# Patient Record
Sex: MALE | Race: WHITE | URBAN - METROPOLITAN AREA
[De-identification: names, ages, dates, MRNs, and addresses within clinical notes are randomized per-mention and may not be internally consistent; named-entity substitution may affect disease eponyms.]

---

## 2017-02-21 ENCOUNTER — OFFICE VISIT (OUTPATIENT)
Dept: UROLOGY | Facility: CLINIC | Age: 42
End: 2017-02-21
Payer: COMMERCIAL

## 2017-02-21 VITALS
SYSTOLIC BLOOD PRESSURE: 108 MMHG | HEIGHT: 71 IN | DIASTOLIC BLOOD PRESSURE: 70 MMHG | HEART RATE: 70 BPM | WEIGHT: 147 LBS | BODY MASS INDEX: 20.58 KG/M2

## 2017-02-21 DIAGNOSIS — R36.1 HEMATOSPERMIA: Primary | ICD-10-CM

## 2017-02-21 LAB — PSA SERPL-MCNC: 1.2 NG/ML (ref 0–4)

## 2017-02-21 PROCEDURE — 36415 COLL VENOUS BLD VENIPUNCTURE: CPT | Performed by: UROLOGY

## 2017-02-21 PROCEDURE — 99203 OFFICE O/P NEW LOW 30 MIN: CPT | Performed by: UROLOGY

## 2017-02-21 PROCEDURE — 84153 ASSAY OF PSA TOTAL: CPT | Performed by: UROLOGY

## 2017-02-21 ASSESSMENT — PAIN SCALES - GENERAL: PAINLEVEL: NO PAIN (0)

## 2017-02-21 NOTE — LETTER
Date:February 22, 2017      Patient was self referred, no letter generated. Do not send.        [facility] Health Information

## 2017-02-21 NOTE — PROGRESS NOTES
"It was my pleasure to see Marcos Nicolas a 41 year old year old male today. Patient was seen in consultation for hematospermia.    HPI:   One month history of hematospermia at least 3-4x instances.   No pain associated with these episodes.     No dysuria, frequency, hematuria.     No family history of prostate cancer    Past Medical History   Diagnosis Date     Blood in semen        No past surgical history on file.    Family History   Problem Relation Age of Onset     HEART DISEASE Paternal Grandfather        Current Outpatient Prescriptions   Medication Sig Dispense Refill     Zolpidem Tartrate (AMBIEN PO)          ALLERGIES: Review of patient's allergies indicates no known allergies.      REVIEW OF SYSTEMS:  Skin: negative  Eyes: negative  Ears/Nose/Throat: negative  Respiratory: No shortness of breath, dyspnea on exertion, cough, or hemoptysis  Cardiovascular: negative  Gastrointestinal: negative  Genitourinary: as above  Musculoskeletal: negative  Neurologic: negative  Psychiatric: negative  Hematologic/Lymphatic/Immunologic: negative  Endocrine: negative      GENERAL PHYSICAL EXAM:   Vitals: /70 (BP Location: Left arm, Patient Position: Chair, Cuff Size: Adult Regular)  Pulse 70  Ht 1.803 m (5' 11\")  Wt 66.7 kg (147 lb)  BMI 20.5 kg/m2  Body mass index is 20.5 kg/(m^2).  Constitutional: healthy, alert and no distress  Head: Normocephalic  Neck: Neck supple  Cardiovascular: negative  Respiratory: negative  Gastrointestinal: Abdomen soft, non-tender  Musculoskeletal: extremities normal  Skin: no suspicious lesions or rashes  Neurologic: Gait katarzyna  Psychiatric: affect normal/bright and mentation appears normal.       EXAM:   Left Flank: negative  Right Flank: negative  Inguinal Area: normal  Phallus is meatus normal and no plaques palpated.   Left testis descended, size is normal , consistency is normal. Intra-testicular masses Absent.  Right testis descended, size is normal, consistency is normal. " Intra-testicular masses Absent.      RECTAL EXAM:   Size: 1+   Sphincter tone: normal  Tenderness: Absent  Rectal Mass: Absent  Prostate Nodule: Absent         RADIOLOGY: The following tests were reviewed: Need to complete the following Radiology exams prior to the office appointment:  LABS: The last test results for Needs to complete the necessary tests prior to appointment: were reviewed.     ASSESSMENT: 42 y/o M with hematospermia      PLAN:   Will send PSA today   Semen culture   If above are negative, patient will follow up PRN       I spent over 30 minutes with the patient.  Over half this time was spent on counseling for hematospermia.

## 2017-02-21 NOTE — MR AVS SNAPSHOT
"              After Visit Summary   2/21/2017    Marcos Nicolas    MRN: [id number]           Patient Information     Date Of Birth          1975        Visit Information        Provider Department      2/21/2017 3:30 PM Wanda Corea MD [facility] [place]        Today's Diagnoses     Hematospermia    -  1       Follow-ups after your visit        Future tests that were ordered for you today     Open Future Orders        Priority Expected Expires Ordered    Genital Special Culture Aerob Bacterial Routine  2/21/2018 2/21/2017            Who to contact     If you have questions or need follow up information about today's clinic visit or your schedule please contact [facility] directly at [phone].  Normal or non-critical lab and imaging results will be communicated to you by [employer], letter or phone within 4 business days after the clinic has received the results. If you do not hear from us within 7 days, please contact the clinic through [employer] or phone. If you have a critical or abnormal lab result, we will notify you by phone as soon as possible.  Submit refill requests through [employer] or call your pharmacy and they will forward the refill request to us. Please allow 3 business days for your refill to be completed.          Additional Information About Your Visit        [employer] Information     [employer] lets you send messages to your doctor, view your test results, renew your prescriptions, schedule appointments and more. To sign up, go to [email or web address] . Click on \"Log in\" on the left side of the screen, which will take you to the Welcome page. Then click on \"Sign up Now\" on the right side of the page.     You will be asked to enter the access code listed below, as well as some personal information. Please follow the directions to create your username and password.     Your access code is: [email or web address]  Expires: " "2017  5:47 PM     Your access code will  in 90 days. If you need help or a new code, please call your [facility] clinic or [phone].        Care EveryWhere ID     This is your Care EveryWhere ID. This could be used by other organizations to access your [facility] medical records  [id number]        Your Vitals Were     Pulse Height BMI (Body Mass Index)             70 1.803 m (5' 11\") 20.5 kg/m2          Blood Pressure from Last 3 Encounters:   17 108/70    Weight from Last 3 Encounters:   17 66.7 kg (147 lb)              We Performed the Following     Genital Special Culture Aerob Bacterial     PSA Diag Urologic Phys        Primary Care Provider    None       No address on file        Thank you!     Thank you for choosing [facility] [place]  for your care. Our goal is always to provide you with excellent care. Hearing back from our patients is one way we can continue to improve our services. Please take a few minutes to complete the written survey that you may receive in the mail after your visit with us. Thank you!             Your Updated Medication List - Protect others around you: Learn how to safely use, store and throw away your medicines at www.disposemymeds.org.          This list is accurate as of: 17  5:47 PM.  Always use your most recent med list.                   Brand Name Dispense Instructions for use    AMBIEN PO            "

## 2017-02-21 NOTE — LETTER
"2/21/2017       RE: Marcos Nicolas  [address] [address]     Dear Colleague,    Thank you for referring your patient, Marcos Nicolas, to the [facility] [place] at [facility]. Please see a copy of my visit note below.    It was my pleasure to see Marcos Nicolas a 41 year old year old male today. Patient was seen in consultation for hematospermia.    HPI:   One month history of hematospermia at least 3-4x instances.   No pain associated with these episodes.     No dysuria, frequency, hematuria.     No family history of prostate cancer    Past Medical History   Diagnosis Date     Blood in semen        No past surgical history on file.    Family History   Problem Relation Age of Onset     HEART DISEASE Paternal Grandfather        Current Outpatient Prescriptions   Medication Sig Dispense Refill     Zolpidem Tartrate (AMBIEN PO)          ALLERGIES: Review of patient's allergies indicates no known allergies.      REVIEW OF SYSTEMS:  Skin: negative  Eyes: negative  Ears/Nose/Throat: negative  Respiratory: No shortness of breath, dyspnea on exertion, cough, or hemoptysis  Cardiovascular: negative  Gastrointestinal: negative  Genitourinary: as above  Musculoskeletal: negative  Neurologic: negative  Psychiatric: negative  Hematologic/Lymphatic/Immunologic: negative  Endocrine: negative      GENERAL PHYSICAL EXAM:   Vitals: /70 (BP Location: Left arm, Patient Position: Chair, Cuff Size: Adult Regular)  Pulse 70  Ht 1.803 m (5' 11\")  Wt 66.7 kg (147 lb)  BMI 20.5 kg/m2  Body mass index is 20.5 kg/(m^2).  Constitutional: healthy, alert and no distress  Head: Normocephalic  Neck: Neck supple  Cardiovascular: negative  Respiratory: negative  Gastrointestinal: Abdomen soft, non-tender  Musculoskeletal: extremities normal  Skin: no suspicious lesions or rashes  Neurologic: Gait katarzyna  Psychiatric: affect normal/bright and mentation " appears normal.       EXAM:   Left Flank: negative  Right Flank: negative  Inguinal Area: normal  Phallus is meatus normal and no plaques palpated.   Left testis descended, size is normal , consistency is normal. Intra-testicular masses Absent.  Right testis descended, size is normal, consistency is normal. Intra-testicular masses Absent.      RECTAL EXAM:   Size: 1+   Sphincter tone: normal  Tenderness: Absent  Rectal Mass: Absent  Prostate Nodule: Absent         RADIOLOGY: The following tests were reviewed: Need to complete the following Radiology exams prior to the office appointment:  LABS: The last test results for Needs to complete the necessary tests prior to appointment: were reviewed.     ASSESSMENT: 40 y/o M with hematospermia      PLAN:   Will send PSA today   Semen culture   If above are negative, patient will follow up PRN       I spent over 30 minutes with the patient.  Over half this time was spent on counseling for hematospermia.      Again, thank you for allowing me to participate in the care of your patient.      Sincerely,    Wanda Corea MD

## 2017-02-23 LAB
BACTERIA SPEC CULT: NORMAL
MICRO REPORT STATUS: NORMAL
SPECIMEN SOURCE: NORMAL

## 2017-02-28 DIAGNOSIS — R36.1 HEMATOSPERMIA: ICD-10-CM

## 2017-02-28 LAB
BACTERIA SPEC CULT: NORMAL
MICRO REPORT STATUS: NORMAL
SPECIMEN SOURCE: NORMAL

## 2017-02-28 PROCEDURE — 87070 CULTURE OTHR SPECIMN AEROBIC: CPT | Performed by: UROLOGY

## 2017-03-02 LAB
BACTERIA SPEC CULT: NORMAL
MICRO REPORT STATUS: NORMAL
SPECIMEN SOURCE: NORMAL

## 2017-04-04 ENCOUNTER — TELEPHONE (OUTPATIENT)
Dept: UROLOGY | Facility: CLINIC | Age: 42
End: 2017-04-04